# Patient Record
(demographics unavailable — no encounter records)

---

## 2025-01-29 NOTE — HISTORY OF PRESENT ILLNESS
[LMP unknown] : LMP unknown [postmenopausal] : postmenopausal [N] : Patient does not use contraception [Y] : Positive pregnancy history [No] : Patient does not have concerns regarding sex [Currently Active] : currently active [Men] : men [TextBox_4] : The 62-year-old patient presents today for a routine GYN exam. Patient is doing well. She offers no complaints. Patient is currently on HRT. She states that she gets irritated with the adhesive on the patch. Patient currently on a biweekly patch, CombiPatch, as she switched from once weekly Climara as she found the patch wasn't staying on the whole week. We reviewed together in detail her past medical and surgical histories, allergies and medication usage, social and family history. All questions were answered in easy-to-understand language. [Mammogramdate] : 07/24/2024 [BreastSonogramDate] : 07/24/2024 [PapSmeardate] : 11/22/2023 [BoneDensityDate] : 07/24/2024 [ColonoscopyDate] : 10/2024 [TextBox_78] : No history of HPV [PGHxTotal] : 3 [[facility] : [facility]

## 2025-01-29 NOTE — PHYSICAL EXAM
[Chaperone Present] : A chaperone was present in the examining room during all aspects of the physical examination [[id number]] : A chaperone was present during the pelvic exam. [Appropriately responsive] : appropriately responsive [Alert] : alert [No Acute Distress] : no acute distress [No Lymphadenopathy] : no lymphadenopathy [Soft] : soft [Non-tender] : non-tender [Non-distended] : non-distended [No HSM] : No HSM [No Lesions] : no lesions [No Mass] : no mass [Oriented x3] : oriented x3 [Examination Of The Breasts] : a normal appearance [No Masses] : no breast masses were palpable [Labia Majora] : normal [Labia Minora] : normal [Uterine Adnexae] : normal [Normal rectal exam] : was normal [Normal Brown Stool] : was normal and brown [Normal] : was normal [None] : there was no rectal mass  [FreeTextEntry2] : Lori Gayle [FreeTextEntry3] : This note was written by Lori Gayle on 01/29/2025, acting as a scribe for Dr. Milan Scales MD. All medic record entries were at my, Dr. Milan Scales MD, direction and personally dictated by me in 01/29/2025. I have personally reviewed the chart and agree that the record accurately reflects my personal performance of the history, physical exam, assessment, and plan. [Occult Blood Positive] : was negative for occult blood analysis [Internal Hemorrhoid] : no internal hemorrhoids were present [External Hemorrhoid] : no external hemorrhoids were present [Skin Tags] : no residual hemorrhoidal skin tags

## 2025-01-29 NOTE — PLAN
[FreeTextEntry1] : 1)  Self breast exam instructions, mammography discussed with the patient. 2)  Lipid profile assessment, TSH screening, fasting glucose testing, and vitamin D supplementation were discussed with the patient. 3)  Maintain healthy weight. 4)  Regular health maintenance with PCP. 5)  Remain tobacco free. 6)  Limit alcohol intake to less than 5 drinks per week. 7)  Osteoporosis screening and colonoscopy screening. 8)  Annual cholesterol screening. 9)  Annual influenza vaccine. 10) The importance of routine physical activity was reviewed and a goal of 150 minutes of moderately vigorous exercise per week was endorsed.   Yearly breast cancer screening with no current clinical or radiographic concerns. The patient was reminded regarding future well breast and general healthcare, breast cancer risk reduction, the importance of self-examination and the need for follow up. She was again reminded of the need to take Vit D3 2500 IU daily or to keep a Vit D level above 30, and Turmeric 1000 mg daily with Black Pepper. Plan continued yearly imaging and breast follow up, sooner as needed. I counseled the patient on current recommendations to reduce breast cancer risk including but not inclusive to regular exercise 20-30 minutes 3-4 times a week, low fat diet, limiting alcohol consumption, maintenance of ideal body weight, yearly imaging and self-breast awareness. Questions answered. I encouraged in light of COVID-19, social distancing, frequent hand washing and precautions to stay healthy.  Risk, benefits, alternatives of hormone replacement therapy including but not limited to deep venous thrombosis, thromboembolism, cardiovascular disease and breast cancer were discussed with the patient. The risks of unopposed estrogen and the recommendation for progestins if a uterus is present was discussed and all questions answered.   I have spent 40 minutes of time on this encounter. Greater than 50% of the face-to-face encounter time was spent on counseling and/or coordination of care for examination findings, differential, testing, management and planning.